# Patient Record
Sex: FEMALE | Race: WHITE | NOT HISPANIC OR LATINO | URBAN - METROPOLITAN AREA
[De-identification: names, ages, dates, MRNs, and addresses within clinical notes are randomized per-mention and may not be internally consistent; named-entity substitution may affect disease eponyms.]

---

## 2018-12-03 ENCOUNTER — OFFICE VISIT (OUTPATIENT)
Dept: FAMILY MEDICINE | Facility: CLINIC | Age: 38
End: 2018-12-03
Payer: COMMERCIAL

## 2018-12-03 VITALS
OXYGEN SATURATION: 98 % | WEIGHT: 206 LBS | BODY MASS INDEX: 35.17 KG/M2 | TEMPERATURE: 97.9 F | HEART RATE: 113 BPM | DIASTOLIC BLOOD PRESSURE: 77 MMHG | SYSTOLIC BLOOD PRESSURE: 116 MMHG | HEIGHT: 64 IN

## 2018-12-03 DIAGNOSIS — B34.9 ACUTE VIRAL SYNDROME: Primary | ICD-10-CM

## 2018-12-03 PROBLEM — J30.2 SEASONAL ALLERGIES: Status: ACTIVE | Noted: 2017-01-24

## 2018-12-03 PROBLEM — N39.0 UTI (URINARY TRACT INFECTION): Status: ACTIVE | Noted: 2017-01-24

## 2018-12-03 PROBLEM — R87.629 ABNORMAL PAP SMEAR OF VAGINA: Status: ACTIVE | Noted: 2017-01-24

## 2018-12-03 PROBLEM — J45.909 ASTHMA: Status: ACTIVE | Noted: 2017-01-24

## 2018-12-03 PROCEDURE — 99213 OFFICE O/P EST LOW 20 MIN: CPT | Performed by: FAMILY MEDICINE

## 2018-12-03 RX ORDER — FOLIC ACID/MULTIVIT,IRON,MINER 0.4MG-18MG
TABLET ORAL
COMMUNITY
Start: 2017-10-09 | End: 2024-07-01 | Stop reason: ALTCHOICE

## 2018-12-03 RX ORDER — FERROUS SULFATE 325(65) MG
1 TABLET ORAL EVERY OTHER DAY
Refills: 3 | COMMUNITY
Start: 2018-10-03 | End: 2019-07-30 | Stop reason: ALTCHOICE

## 2018-12-03 RX ORDER — BLACK COHOSH ROOT 200 MG
500 CAPSULE ORAL
Refills: 0 | COMMUNITY
Start: 2018-09-06 | End: 2019-07-30 | Stop reason: ALTCHOICE

## 2018-12-03 RX ORDER — ALBUTEROL SULFATE 90 UG/1
INHALANT RESPIRATORY (INHALATION)
COMMUNITY
Start: 2017-10-09 | End: 2019-07-30 | Stop reason: SDUPTHER

## 2018-12-03 RX ORDER — DOCUSATE SODIUM 100 MG/1
100 CAPSULE, LIQUID FILLED ORAL EVERY OTHER DAY
COMMUNITY
End: 2019-07-30 | Stop reason: ALTCHOICE

## 2018-12-03 ASSESSMENT — ENCOUNTER SYMPTOMS
SORE THROAT: 0
FEVER: 0
COUGH: 0

## 2018-12-03 NOTE — PROGRESS NOTES
[address] [address]  [place] [address]  [phone]       Reason for visit:   Chief Complaint   Patient presents with   • Sick     Pt stated she woke up this morning with body aches, sore lymph nodes, chills, congestion, and headaches. Her head is stuffy and just doesn't feel so well.  She just got over a cold a week ago.      NINI Bran is a 38 y.o. female who presents with sick. She had a cold last week but this got better. Muscle aches started yesterday. Now has body aches and swollen glands. No known temperature. No sore throat. No ear ache. No cough.      History reviewed. No pertinent past medical history.  History reviewed. No pertinent surgical history.  Social History     Social History   • Marital status:      Spouse name: N/A   • Number of children: N/A   • Years of education: N/A     Occupational History   • Not on file.     Social History Main Topics   • Smoking status: Never Smoker   • Smokeless tobacco: Never Used   • Alcohol use Yes      Comment: social   • Drug use: No   • Sexual activity: Not on file     Other Topics Concern   • Not on file     Social History Narrative    Do you wear your seatbelt? Yes    Do you have smoke detector in your home? Yes    Do you have a carbon monoxide detector in your home? 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          Family History   Problem Relation Age of Onset   • Cancer Maternal Grandmother    • Lung cancer Maternal Grandfather    • Lung cancer Paternal Grandmother    • Thyroid cancer Paternal Grandmother    • Heart attack Paternal Grandfather    • Skin cancer Paternal Grandfather      Azithromycin  Current Outpatient Prescriptions   Medication Sig Dispense Refill   • albuterol HFA (PROAIR HFA) 90 mcg/actuation inhaler inhale 2 puff by inhalation route  every 4 - 6 hours as needed     • ASCORBIC ACID WITH CRISSY HIPS 500 mg tablet Take 500 mg by mouth once  "daily.  0   • docusate sodium (COLACE) 100 mg capsule Take 100 mg by mouth every other day.     • ferrous sulfate 325 mg (65 mg iron) tablet Take 1 tablet by mouth every other day.    3   • Lactobacillus acidophilus (PROBIOTIC ORAL) Take 1 tablet by mouth every other day.     • PNV cmb#95-ferrous fumarate-FA (PRENATAL) 28 mg iron- 800 mcg tablet No SIG Entered       No current facility-administered medications for this visit.        Review of Systems   Constitutional: Negative for fever.   HENT: Negative for ear pain and sore throat.    Respiratory: Negative for cough.      Objective   Vitals:    12/03/18 1605   BP: 116/77   BP Location: Left upper arm   Patient Position: Sitting   Pulse: (!) 113   Temp: 36.6 °C (97.9 °F)   TempSrc: Oral   SpO2: 98%   Weight: 93.4 kg (206 lb)   Height: 1.626 m (5' 4\")     Body mass index is 35.36 kg/m².  Physical Exam   HENT:   Right Ear: Tympanic membrane normal.   Left Ear: Tympanic membrane normal.   Mouth/Throat: Oropharynx is clear and moist.   Pulmonary/Chest: Effort normal and breath sounds normal.   Abdominal: Soft. Bowel sounds are normal.       Procedures    Lab Results   Component Value Date    WBC 8.8 01/24/2017    HGB 14.7 01/24/2017    HCT 44.2 01/24/2017     01/24/2017    CHOL 226 (H) 01/24/2017    TRIG 107 01/24/2017    HDL 54 01/24/2017    LDLCALC 151 (H) 01/24/2017    ALT 18 01/24/2017    AST 18 01/24/2017     01/24/2017    K 4.0 01/24/2017     01/24/2017    CREATININE 0.98 01/24/2017    CO2 20 01/24/2017    TSH 2.51 01/24/2017    GLUCOSE 76 01/24/2017           Assessment   Problem List Items Addressed This Visit     Acute viral syndrome - Primary     Push fluids  Tylenol  Check for fever at home                   Harry A. Frankel, MD  12/4/2018                   "

## 2019-07-30 ENCOUNTER — OFFICE VISIT (OUTPATIENT)
Dept: FAMILY MEDICINE | Facility: CLINIC | Age: 39
End: 2019-07-30
Payer: COMMERCIAL

## 2019-07-30 VITALS
BODY MASS INDEX: 31.18 KG/M2 | HEIGHT: 64 IN | WEIGHT: 182.6 LBS | SYSTOLIC BLOOD PRESSURE: 109 MMHG | DIASTOLIC BLOOD PRESSURE: 83 MMHG | TEMPERATURE: 98.2 F | HEART RATE: 77 BPM | OXYGEN SATURATION: 99 %

## 2019-07-30 DIAGNOSIS — Z00.00 ROUTINE PHYSICAL EXAMINATION: Primary | ICD-10-CM

## 2019-07-30 DIAGNOSIS — Z83.719 FAMILY HISTORY OF COLONIC POLYPS: ICD-10-CM

## 2019-07-30 PROCEDURE — 99395 PREV VISIT EST AGE 18-39: CPT | Performed by: NURSE PRACTITIONER

## 2019-07-30 RX ORDER — ALBUTEROL SULFATE 90 UG/1
2 INHALANT RESPIRATORY (INHALATION) EVERY 4 HOURS PRN
Qty: 1 INHALER | Refills: 3 | Status: SHIPPED | OUTPATIENT
Start: 2019-07-30 | End: 2024-07-01 | Stop reason: SDUPTHER

## 2019-07-30 ASSESSMENT — ENCOUNTER SYMPTOMS
DIZZINESS: 0
AGITATION: 0
DIFFICULTY URINATING: 0
TREMORS: 0
COUGH: 0
BRUISES/BLEEDS EASILY: 0
PHOTOPHOBIA: 0
MYALGIAS: 0
VOICE CHANGE: 0
ABDOMINAL PAIN: 0
VOMITING: 0
WHEEZING: 0
BLOOD IN STOOL: 0
DIARRHEA: 0
FEVER: 0
SHORTNESS OF BREATH: 0
DIAPHORESIS: 0
APPETITE CHANGE: 0
NUMBNESS: 0
TROUBLE SWALLOWING: 0
NAUSEA: 0
CONSTIPATION: 0
ARTHRALGIAS: 0
DECREASED CONCENTRATION: 0
BACK PAIN: 0
WOUND: 0
FATIGUE: 0
HEADACHES: 0
HEMATURIA: 0
LIGHT-HEADEDNESS: 0
CHILLS: 0
JOINT SWELLING: 0
FLANK PAIN: 0
NECK PAIN: 0
CONFUSION: 0
NECK STIFFNESS: 0
NERVOUS/ANXIOUS: 0
ADENOPATHY: 0
COLOR CHANGE: 0
PALPITATIONS: 0
ACTIVITY CHANGE: 0
WEAKNESS: 0
EYE PAIN: 0
CHEST TIGHTNESS: 0
SLEEP DISTURBANCE: 0
UNEXPECTED WEIGHT CHANGE: 0

## 2019-07-30 NOTE — PROGRESS NOTES
[facility]  [address] [place] [address]  [phone]       Reason for visit:   Chief Complaint   Patient presents with   • Annual Exam      HPI   Sonia Bran is a 39 y.o. female who presents for routine annual physical exam    PMHx/PSHx: Seasonal allergies, asthma   Specialists   Ophthalmology: Annual eye exams at [facility]   Dermatology: No - hasn't had skin check in a few years   Gynecology: Annual/OB     Complaints: Denies; 13 month old daughter - still nursing. TTC for 2nd child    Social  Diet: Generally healthy - started Weight Watchers, down 30 lbs  Exercise: Regularly walking, but no formal  Sleep: No complaints  EtOH: Occasional, once/week glass of wine  Tobacco: Denies - never used  Drugs: Denies  Work: Teacher, off for summer  Lives with:  and bjuighter    FamHx: Pat GF - MI; MGF and PGM - Lung CA; Mother at age 39 - precancerous polyp  GynHx: Remote hx abnl pap/HPV s/p colp, STIs, ovarian cysts, fibroids  Menses - Regulated after 6 months post partum periods, between 28-34 days  SA/Contraception - 1 partner/  OBHx: , 9 lbs 5 oz.    Pap: UTD 2017  Mammo: Start at 42  Immunizations: Tdap, Flu        History reviewed. No pertinent past medical history.  History reviewed. No pertinent surgical history.  Social History     Social History   • Marital status:      Spouse name: N/A   • Number of children: N/A   • Years of education: N/A     Occupational History   • Not on file.     Social History Main Topics   • Smoking status: Never Smoker   • Smokeless tobacco: Never Used   • Alcohol use Yes      Comment: social   • Drug use: No   • Sexual activity: Not on file     Other Topics Concern   • Not on file     Social History Narrative    Do you wear your seatbelt? Yes    Do you have smoke detector in your home? Yes    Do you have a carbon monoxide detector in your home? 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          Family History   Problem Relation Age of Onset   • Cancer Maternal Grandmother    • Lung cancer Maternal Grandfather    • Lung cancer Paternal Grandmother    • Thyroid cancer Paternal Grandmother    • Heart attack Paternal Grandfather    • Skin cancer Paternal Grandfather      Azithromycin  Current Outpatient Prescriptions   Medication Sig Dispense Refill   • albuterol HFA (PROAIR HFA) 90 mcg/actuation inhaler Inhale 2 puffs every 4 (four) hours as needed for wheezing. 1 Inhaler 3   • PNV cmb#95-ferrous fumarate-FA (PRENATAL) 28 mg iron- 800 mcg tablet No SIG Entered       No current facility-administered medications for this visit.        Review of Systems   Constitutional: Negative for activity change, appetite change, chills, diaphoresis, fatigue, fever and unexpected weight change.   HENT: Negative for dental problem, hearing loss, tinnitus, trouble swallowing and voice change.    Eyes: Negative for photophobia, pain and visual disturbance.   Respiratory: Negative for cough, chest tightness, shortness of breath and wheezing.    Cardiovascular: Negative for chest pain, palpitations and leg swelling.   Gastrointestinal: Negative for abdominal pain, blood in stool, constipation, diarrhea, nausea and vomiting.   Genitourinary: Negative for difficulty urinating, flank pain, genital sores and hematuria.   Musculoskeletal: Negative for arthralgias, back pain, gait problem, joint swelling, myalgias, neck pain and neck stiffness.   Skin: Negative for color change, rash and wound.   Neurological: Negative for dizziness, tremors, syncope, weakness, light-headedness, numbness and headaches.   Hematological: Negative for adenopathy. Does not bruise/bleed easily.   Psychiatric/Behavioral: Negative for agitation, confusion, decreased concentration, self-injury, sleep disturbance and suicidal ideas. The patient is not nervous/anxious.      Objective   Vitals:    07/30/19 1755   BP: 109/83   Pulse: 77    Temp: 36.8 °C (98.2 °F)   SpO2: 99%       Physical Exam   Constitutional: She is oriented to person, place, and time. Vital signs are normal. She appears well-developed and well-nourished. She is cooperative.  Non-toxic appearance. She does not have a sickly appearance. She does not appear ill. No distress.   HENT:   Head: Normocephalic and atraumatic.   Right Ear: Tympanic membrane, external ear and ear canal normal.   Left Ear: Tympanic membrane, external ear and ear canal normal.   Nose: Nose normal.   Mouth/Throat: Uvula is midline, oropharynx is clear and moist and mucous membranes are normal. Normal dentition.   Eyes: Pupils are equal, round, and reactive to light. Conjunctivae, EOM and lids are normal. Right eye exhibits no discharge. Left eye exhibits no discharge.   Neck: Normal range of motion and full passive range of motion without pain. Neck supple. No JVD present. Carotid bruit is not present. No thyromegaly present.   Cardiovascular: Normal rate, regular rhythm, normal heart sounds, intact distal pulses and normal pulses.  Exam reveals no gallop and no friction rub.    No murmur heard.  Pulmonary/Chest: Effort normal and breath sounds normal. No respiratory distress. She has no wheezes. She has no rales.   Abdominal: Soft. Normal appearance and bowel sounds are normal. She exhibits no distension. There is no tenderness. There is no CVA tenderness.   Musculoskeletal: Normal range of motion. She exhibits no edema, tenderness or deformity.   Lymphadenopathy:     She has no cervical adenopathy.   Neurological: She is alert and oriented to person, place, and time. She has normal reflexes. No cranial nerve deficit.   Skin: Skin is warm, dry and intact. Capillary refill takes less than 2 seconds. No rash noted.   Psychiatric: She has a normal mood and affect. Her speech is normal and behavior is normal. Judgment and thought content normal. Cognition and memory are normal.       Procedures        Assessment    Problem List Items Addressed This Visit     None      Visit Diagnoses     Routine physical examination    -  Primary    Relevant Orders    CBC and Differential    Lipid panel    TSH 3rd Generation    Comprehensive metabolic panel    Family history of colonic polyps        Relevant Orders    Ambulatory referral to Gastroenterology      - Counseled on general health maintenance & reviewed preventive screening guidelines  - VS normal, BMI: 31. Continue efforts in weight loss, counseled on healthy, well-balanced diet and exercise.Discussed stress management and adequate sleep  - Regular follow up with GYN annually, w/ pap screening as recommended.   - Recommend annual skin cancer screening by Dermatology.   - Recommend annual eye exams by Ophtho  - RTO for labs, fasting  - Immunizations: UTD. Tdap with last pregnancy 2018   - Continue PNV        SALTY Llamas  7/30/2019

## 2019-08-08 ENCOUNTER — CLINICAL SUPPORT (OUTPATIENT)
Dept: FAMILY MEDICINE | Facility: CLINIC | Age: 39
End: 2019-08-08
Payer: COMMERCIAL

## 2019-08-08 DIAGNOSIS — N91.2 AMENORRHEA: Primary | ICD-10-CM

## 2019-08-08 DIAGNOSIS — Z00.00 ROUTINE PHYSICAL EXAMINATION: ICD-10-CM

## 2019-08-08 PROCEDURE — 36415 COLL VENOUS BLD VENIPUNCTURE: CPT | Performed by: NURSE PRACTITIONER

## 2019-08-09 LAB
ALBUMIN SERPL-MCNC: 4.4 G/DL (ref 3.6–5.1)
ALBUMIN/GLOB SERPL: 2 (CALC) (ref 1–2.5)
ALP SERPL-CCNC: 53 U/L (ref 33–115)
ALT SERPL-CCNC: 11 U/L (ref 6–29)
AST SERPL-CCNC: 12 U/L (ref 10–30)
B-HCG SERPL-ACNC: 32 MIU/ML
BASOPHILS # BLD AUTO: 30 CELLS/UL (ref 0–200)
BASOPHILS NFR BLD AUTO: 0.4 %
BILIRUB SERPL-MCNC: 1.2 MG/DL (ref 0.2–1.2)
BUN SERPL-MCNC: 18 MG/DL (ref 7–25)
BUN/CREAT SERPL: NORMAL (CALC) (ref 6–22)
CALCIUM SERPL-MCNC: 9.1 MG/DL (ref 8.6–10.2)
CHLORIDE SERPL-SCNC: 104 MMOL/L (ref 98–110)
CHOLEST SERPL-MCNC: 170 MG/DL
CHOLEST/HDLC SERPL: 2.9 (CALC)
CO2 SERPL-SCNC: 22 MMOL/L (ref 20–32)
CREAT SERPL-MCNC: 0.87 MG/DL (ref 0.5–1.1)
EOSINOPHIL # BLD AUTO: 240 CELLS/UL (ref 15–500)
EOSINOPHIL NFR BLD AUTO: 3.2 %
ERYTHROCYTE [DISTWIDTH] IN BLOOD BY AUTOMATED COUNT: 12 % (ref 11–15)
GLOBULIN SER CALC-MCNC: 2.2 G/DL (CALC) (ref 1.9–3.7)
GLUCOSE SERPL-MCNC: 78 MG/DL (ref 65–99)
HCT VFR BLD AUTO: 43.6 % (ref 35–45)
HDLC SERPL-MCNC: 59 MG/DL
HGB BLD-MCNC: 14.9 G/DL (ref 11.7–15.5)
LDLC SERPL CALC-MCNC: 98 MG/DL (CALC)
LYMPHOCYTES # BLD AUTO: 2978 CELLS/UL (ref 850–3900)
LYMPHOCYTES NFR BLD AUTO: 39.7 %
MCH RBC QN AUTO: 32.9 PG (ref 27–33)
MCHC RBC AUTO-ENTMCNC: 34.2 G/DL (ref 32–36)
MCV RBC AUTO: 96.2 FL (ref 80–100)
MONOCYTES # BLD AUTO: 488 CELLS/UL (ref 200–950)
MONOCYTES NFR BLD AUTO: 6.5 %
NEUTROPHILS # BLD AUTO: 3765 CELLS/UL (ref 1500–7800)
NEUTROPHILS NFR BLD AUTO: 50.2 %
NONHDLC SERPL-MCNC: 111 MG/DL (CALC)
PLATELET # BLD AUTO: 257 THOUSAND/UL (ref 140–400)
PMV BLD REES-ECKER: 9.7 FL (ref 7.5–12.5)
POTASSIUM SERPL-SCNC: 4.2 MMOL/L (ref 3.5–5.3)
PROT SERPL-MCNC: 6.6 G/DL (ref 6.1–8.1)
QUEST EGFR NON-AFR. AMERICAN: 85 ML/MIN/1.73M2
RBC # BLD AUTO: 4.53 MILLION/UL (ref 3.8–5.1)
SODIUM SERPL-SCNC: 139 MMOL/L (ref 135–146)
TRIGL SERPL-MCNC: 43 MG/DL
TSH SERPL-ACNC: 1.44 MIU/L
WBC # BLD AUTO: 7.5 THOUSAND/UL (ref 3.8–10.8)

## 2019-08-13 ENCOUNTER — TELEPHONE (OUTPATIENT)
Dept: FAMILY MEDICINE | Facility: CLINIC | Age: 39
End: 2019-08-13

## 2019-08-13 NOTE — TELEPHONE ENCOUNTER
- Spoke with patient discussed labs. hcg appropriate for pregnancy  - Pt saw Dr. Ureña GI - she is due for Colonoscopy d/t family hx, she will call to schedule AFTER pregnancy.

## 2019-09-16 ENCOUNTER — TELEPHONE (OUTPATIENT)
Dept: FAMILY MEDICINE | Facility: CLINIC | Age: 39
End: 2019-09-16

## 2019-09-16 NOTE — TELEPHONE ENCOUNTER
Patient is calling in regards to coming in to have her levels checked. She states that she has been to OB and her embryo is 3 weeks behind. She has an US scheduled for 2 weeks but would like to come into office to have levels checked as well to make sure everything is okay. Please call patient when available.

## 2019-09-17 NOTE — TELEPHONE ENCOUNTER
Can you let her know that this isn't something I typically do. I only check hcg levels to diagnose pregnancy not to monitor the embryo. She should ask her OB to do this.

## 2020-11-12 PROBLEM — B34.9 ACUTE VIRAL SYNDROME: Status: RESOLVED | Noted: 2018-12-03 | Resolved: 2020-11-12

## 2020-11-12 PROBLEM — N39.0 UTI (URINARY TRACT INFECTION): Status: RESOLVED | Noted: 2017-01-24 | Resolved: 2020-11-12

## 2020-11-16 ENCOUNTER — OFFICE VISIT (OUTPATIENT)
Dept: FAMILY MEDICINE | Facility: CLINIC | Age: 40
End: 2020-11-16
Payer: COMMERCIAL

## 2020-11-16 ENCOUNTER — TELEPHONE (OUTPATIENT)
Dept: FAMILY MEDICINE | Facility: CLINIC | Age: 40
End: 2020-11-16

## 2020-11-16 VITALS
WEIGHT: 222 LBS | HEIGHT: 64 IN | HEART RATE: 93 BPM | DIASTOLIC BLOOD PRESSURE: 71 MMHG | SYSTOLIC BLOOD PRESSURE: 100 MMHG | BODY MASS INDEX: 37.9 KG/M2 | OXYGEN SATURATION: 99 % | TEMPERATURE: 97.7 F

## 2020-11-16 DIAGNOSIS — Z00.00 WELL ADULT HEALTH CHECK: Primary | ICD-10-CM

## 2020-11-16 DIAGNOSIS — J30.2 SEASONAL ALLERGIES: ICD-10-CM

## 2020-11-16 DIAGNOSIS — J45.20 MILD INTERMITTENT ASTHMA WITHOUT COMPLICATION: ICD-10-CM

## 2020-11-16 PROCEDURE — 99395 PREV VISIT EST AGE 18-39: CPT | Performed by: NURSE PRACTITIONER

## 2020-11-16 RX ORDER — NORGESTIMATE AND ETHINYL ESTRADIOL 0.25-0.035
KIT ORAL
COMMUNITY
End: 2020-11-16

## 2020-11-16 RX ORDER — NAPROXEN 500 MG/1
TABLET ORAL
COMMUNITY
End: 2020-11-16

## 2020-11-16 RX ORDER — ECONAZOLE NITRATE 10 MG/G
CREAM TOPICAL
COMMUNITY
End: 2020-11-16

## 2020-11-16 ASSESSMENT — ENCOUNTER SYMPTOMS
ACTIVITY CHANGE: 0
DYSPHORIC MOOD: 0
ADENOPATHY: 0
ABDOMINAL PAIN: 0
COUGH: 0
BRUISES/BLEEDS EASILY: 0
UNEXPECTED WEIGHT CHANGE: 0
DIZZINESS: 0
ABDOMINAL DISTENTION: 0
HEADACHES: 0
MYALGIAS: 0
EYE DISCHARGE: 0
WEAKNESS: 0
DIFFICULTY URINATING: 0
ARTHRALGIAS: 0
FEVER: 0
EYE PAIN: 0
PALPITATIONS: 0
NUMBNESS: 0
SLEEP DISTURBANCE: 0
TROUBLE SWALLOWING: 0
APPETITE CHANGE: 0
NERVOUS/ANXIOUS: 0
FATIGUE: 0
BLOOD IN STOOL: 0
CHEST TIGHTNESS: 0
COLOR CHANGE: 0
CONFUSION: 0
HEMATURIA: 0
DYSURIA: 0
ENDOCRINE NEGATIVE: 1
SHORTNESS OF BREATH: 0

## 2020-11-16 NOTE — PATIENT INSTRUCTIONS
Routine advice for women:  1) I recommend limiting red meat (< or = to 1x/week), fried foods, and full-fat dairy (ice cream, cheese, etc.).    2) Exercise goal is 150 mins/week (30 mins most days of the week).    3) Monthly self breast exams.  Mammograms every 1 year if >40 years old, or possibly earlier if you have a family history of breast cancer.  4) PAP (cervical cell test) every 3 years if they have been normal in the past.  5) Colonoscopy starting at 50 years old for colon cancer screening.  This screening may start earlier if you have a family history of colon cancer.  6) You can take over the counter Calcium 600 mg with Vitamin D3 400 IU 2 tablets/day if you do not get 1,200 mg/day of calcium from your diet.

## 2020-11-16 NOTE — PROGRESS NOTES
Subjective      Patient ID: Sonia Bran is a 40 y.o. female.  1980      Well Adult Physical   Patient here for a comprehensive physical exam.    Do you take any herbs or supplements that were not prescribed by a doctor? Yes, PNV  Are you taking calcium supplements? no   Are you taking aspirin daily? no     History:  LMP: No LMP recorded. Patient is pregnant.  BRITTANY 2/5/21.  Last pap date: will request  Abnormal pap? Yes, history      Pt would like work exemption so she can work from home, since she is pregnant and has asthma.  States she only needs Albuterol inhaler when she is sick.  States her asthma is bad when she gets sick.        The following have been reviewed and updated as appropriate in this visit:  Tobacco  Allergies  Meds  Problems  Med Hx  Surg Hx  Fam Hx        Family History   Problem Relation Age of Onset   • Cancer Maternal Grandmother    • Lung cancer Maternal Grandfather    • Lung cancer Paternal Grandmother    • Thyroid cancer Paternal Grandmother    • Heart attack Paternal Grandfather    • Skin cancer Paternal Grandfather    • Stroke Paternal Grandfather    • Breast cancer Neg Hx        Review of Systems   Constitutional: Negative for activity change, appetite change, fatigue, fever and unexpected weight change.   HENT: Negative for congestion, ear pain, hearing loss and trouble swallowing.    Eyes: Negative for pain, discharge and visual disturbance.   Respiratory: Negative for cough, chest tightness and shortness of breath.    Cardiovascular: Negative for chest pain, palpitations and leg swelling.   Gastrointestinal: Negative for abdominal distention, abdominal pain and blood in stool.        Plans to get colonoscopy after she has delivered, since her mom had polyps in her 30s.   Endocrine: Negative.  Negative for cold intolerance and heat intolerance.   Genitourinary: Negative for difficulty urinating, dysuria and hematuria.        Urinating more frequently with pregnancy  "  Musculoskeletal: Negative for arthralgias and myalgias.   Skin: Negative for color change and rash.   Allergic/Immunologic: Positive for environmental allergies. Negative for immunocompromised state.        Pt reports her OB checked her immunity for MMR and pt was not immune to some things   Neurological: Negative for dizziness, weakness, numbness and headaches.   Hematological: Negative for adenopathy. Does not bruise/bleed easily.   Psychiatric/Behavioral: Negative for confusion, dysphoric mood, sleep disturbance and suicidal ideas. The patient is not nervous/anxious.        Objective     Vitals:    11/16/20 0836   BP: 100/71   BP Location: Left upper arm   Patient Position: Sitting   Pulse: 93   Temp: 36.5 °C (97.7 °F)   TempSrc: Temporal   SpO2: 99%   Weight: 101 kg (222 lb)   Height: 1.626 m (5' 4\")     Body mass index is 38.11 kg/m².    Physical Exam  Vitals signs and nursing note reviewed.   Constitutional:       Appearance: Normal appearance. She is well-developed. She is not ill-appearing or toxic-appearing.   HENT:      Head: Normocephalic and atraumatic.      Right Ear: Hearing, tympanic membrane and external ear normal.      Left Ear: Hearing, tympanic membrane and external ear normal.      Nose: Nose normal.      Mouth/Throat:      Dentition: Normal dentition.   Eyes:      General: Lids are normal. No scleral icterus.        Right eye: No discharge.         Left eye: No discharge.      Conjunctiva/sclera: Conjunctivae normal.      Right eye: Right conjunctiva is not injected.      Left eye: Left conjunctiva is not injected.      Pupils: Pupils are equal, round, and reactive to light.   Neck:      Musculoskeletal: Full passive range of motion without pain and neck supple.      Thyroid: No thyromegaly.      Vascular: No carotid bruit.      Trachea: Trachea normal.   Cardiovascular:      Rate and Rhythm: Normal rate and regular rhythm.      Pulses:           Dorsalis pedis pulses are 2+ on the right side " and 2+ on the left side.      Heart sounds: Normal heart sounds. No murmur. No friction rub. No gallop.    Pulmonary:      Effort: Pulmonary effort is normal.      Breath sounds: Normal breath sounds.   Abdominal:      General: Bowel sounds are normal.      Palpations: Abdomen is soft.   Musculoskeletal: Normal range of motion.         General: No tenderness.   Lymphadenopathy:      Cervical: No cervical adenopathy.      Upper Body:      Right upper body: No supraclavicular adenopathy.      Left upper body: No supraclavicular adenopathy.   Skin:     General: Skin is warm and dry.      Capillary Refill: Capillary refill takes less than 2 seconds.   Neurological:      Mental Status: She is alert and oriented to person, place, and time.      Cranial Nerves: No cranial nerve deficit.      Coordination: Coordination normal.      Deep Tendon Reflexes: Reflexes normal.   Psychiatric:         Speech: Speech normal.         Behavior: Behavior normal.         Thought Content: Thought content normal.         Judgment: Judgment normal.         Assessment/Plan   Diagnoses and all orders for this visit:    Well adult health check (Primary)  Eye UTD.  Due for dental exam (slightly delayed with COVID).  Will request PAP from OB/GYN.  Pt had recent labs with OB/GYN, will try to request.    Mild intermittent asthma without complication  Stable.      Seasonal allergies  Stable.    Physical 1 year.

## 2020-11-17 NOTE — TELEPHONE ENCOUNTER
I attempted to call their office and pressed option for physician line.     [phone]    I waited on hold for over 20 mins.   I will try again later

## 2021-04-13 DIAGNOSIS — Z23 ENCOUNTER FOR IMMUNIZATION: ICD-10-CM

## 2022-05-03 ENCOUNTER — TELEPHONE (OUTPATIENT)
Dept: FAMILY MEDICINE | Facility: CLINIC | Age: 42
End: 2022-05-03
Payer: COMMERCIAL

## 2022-05-03 NOTE — TELEPHONE ENCOUNTER
Pt tested positive for Covid today. She has little symptoms but is concerned as she has 2 small children and wanted to know how she can protect them from being infected?

## 2022-05-03 NOTE — TELEPHONE ENCOUNTER
Spoke to patient. Encouraged double masking and isolated as much as possible. This is difficult because she is breast feeding. Family will continue to monitor symptoms.

## 2022-05-10 ENCOUNTER — TELEPHONE (OUTPATIENT)
Dept: FAMILY MEDICINE | Facility: CLINIC | Age: 42
End: 2022-05-10
Payer: COMMERCIAL

## 2022-05-10 NOTE — LETTER
05/11/22    RE:Sonia Brna    Please excuse Ms. Bran from work 5/3/22 through 5/13/22. She may return to work 5/16/22.    Regards,        Tammy Dudley, DO

## 2022-05-10 NOTE — TELEPHONE ENCOUNTER
Pt tested positive for Covid May 3. She is allowed to be out of work for a total of 10 days. 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

## 2022-05-11 NOTE — TELEPHONE ENCOUNTER
Letter completed. Just to clarify, is she feeling better? If not, please offer a telemedicine visit.

## 2022-06-29 ENCOUNTER — OFFICE VISIT (OUTPATIENT)
Dept: FAMILY MEDICINE | Facility: CLINIC | Age: 42
End: 2022-06-29
Payer: COMMERCIAL

## 2022-06-29 VITALS
WEIGHT: 189.4 LBS | TEMPERATURE: 98.1 F | HEART RATE: 84 BPM | OXYGEN SATURATION: 99 % | HEIGHT: 64 IN | DIASTOLIC BLOOD PRESSURE: 79 MMHG | BODY MASS INDEX: 32.33 KG/M2 | SYSTOLIC BLOOD PRESSURE: 129 MMHG

## 2022-06-29 DIAGNOSIS — H57.89 RED EYE: Primary | ICD-10-CM

## 2022-06-29 DIAGNOSIS — J45.20 MILD INTERMITTENT ASTHMA WITHOUT COMPLICATION: ICD-10-CM

## 2022-06-29 PROCEDURE — 3008F BODY MASS INDEX DOCD: CPT | Performed by: FAMILY MEDICINE

## 2022-06-29 PROCEDURE — 99213 OFFICE O/P EST LOW 20 MIN: CPT | Performed by: FAMILY MEDICINE

## 2022-06-29 RX ORDER — GENTAMICIN SULFATE 3 MG/ML
SOLUTION/ DROPS OPHTHALMIC
COMMUNITY
Start: 2022-06-23 | End: 2024-07-01 | Stop reason: ALTCHOICE

## 2022-06-29 ASSESSMENT — ENCOUNTER SYMPTOMS
PHOTOPHOBIA: 0
EYE REDNESS: 1

## 2022-06-29 NOTE — PROGRESS NOTES
[place] [address]  [phone]       Reason for visit:   Chief Complaint   Patient presents with   • eye problem       NINI Bran is a 41 y.o. female who presents with red eye on the left. Right eye was draining. She called telemed and was put on gentamycin eye drop. It worked but she continued to use. Now right eye is fine but she has redness of left eye      History reviewed. No pertinent past medical history.  Past Surgical History:   Procedure Laterality Date   •  SECTION     • DILATION AND EVACUATION       Social History     Tobacco Use   • Smoking status: Never Smoker   • Smokeless tobacco: Never Used   Vaping Use   • Vaping Use: Never used   Substance Use Topics   • Alcohol use: Not Currently   • Drug use: No      Social History     Social History Narrative    Do you wear your seatbelt? Yes    Do you have smoke detector in your home? Yes    Do you have a carbon monoxide detector in your home? 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      Family History   Problem Relation Age of Onset   • Cancer Maternal Grandmother    • Lung cancer Maternal Grandfather    • Lung cancer Paternal Grandmother    • Thyroid cancer Paternal Grandmother    • Heart attack Paternal Grandfather    • Skin cancer Paternal Grandfather    • Stroke Paternal Grandfather    • Breast cancer Neg Hx      Azithromycin  Current Outpatient Medications   Medication Sig Dispense Refill   • albuterol HFA (PROAIR HFA) 90 mcg/actuation inhaler Inhale 2 puffs every 4 (four) hours as needed for wheezing. 1 Inhaler 3   • gentamicin (GARAMYCIN) 0.3 % ophthalmic solution INSTILL 1 DROP IN EACH EYE 4 TIMES A DAY     • PNV cmb#95-ferrous fumarate-FA 28 mg iron- 800 mcg tablet No SIG Entered       No current facility-administered medications for this visit.       Patient Active Problem List    Diagnosis Date Noted   • Red eye 2022   •  "Abnormal Pap smear of vagina 01/24/2017   • Asthma 01/24/2017   • Seasonal allergies 01/24/2017         Review of Systems   Eyes: Positive for redness. Negative for photophobia and visual disturbance.     Objective   Vitals:    06/29/22 1440   BP: 129/79   BP Location: Right upper arm   Patient Position: Sitting   Pulse: 84   Temp: 36.7 °C (98.1 °F)   TempSrc: Oral   SpO2: 99%   Weight: 85.9 kg (189 lb 6.4 oz)   Height: 1.626 m (5' 4\")     Body mass index is 32.51 kg/m².  Physical Exam  Eyes:      Comments: Left medial conjuntiva is red and irritated         Procedures    Lab Results   Component Value Date    WBC 7.5 08/08/2019    HGB 14.9 08/08/2019    HCT 43.6 08/08/2019     08/08/2019    CHOL 170 08/08/2019    TRIG 43 08/08/2019    HDL 59 08/08/2019    LDLCALC 98 08/08/2019    ALT 11 08/08/2019    AST 12 08/08/2019     08/08/2019    K 4.2 08/08/2019     08/08/2019    CREATININE 0.87 08/08/2019    BUN 18 08/08/2019    CO2 22 08/08/2019    TSH 1.44 08/08/2019    GLUCOSE 78 08/08/2019           Assessment   Problem List Items Addressed This Visit        Respiratory    Asthma       Other    Red eye - Primary     I suspect irritation from gentamycin  Stop drops call with worsening but she will call with update in 2 days  TC steroid eye drop or see eye doctor                     Harry A. Frankel, MD  6/29/2022                   "

## 2022-06-29 NOTE — ASSESSMENT & PLAN NOTE
I suspect irritation from gentamycin  Stop drops call with worsening but she will call with update in 2 days  TC steroid eye drop or see eye doctor

## 2022-07-22 ENCOUNTER — OFFICE VISIT (OUTPATIENT)
Dept: FAMILY MEDICINE | Facility: CLINIC | Age: 42
End: 2022-07-22
Payer: COMMERCIAL

## 2022-07-22 VITALS
DIASTOLIC BLOOD PRESSURE: 78 MMHG | SYSTOLIC BLOOD PRESSURE: 111 MMHG | HEIGHT: 64 IN | WEIGHT: 190.6 LBS | BODY MASS INDEX: 32.54 KG/M2 | TEMPERATURE: 98 F | OXYGEN SATURATION: 99 % | HEART RATE: 70 BPM

## 2022-07-22 DIAGNOSIS — Z13.220 SCREENING CHOLESTEROL LEVEL: ICD-10-CM

## 2022-07-22 DIAGNOSIS — Z00.00 ENCOUNTER FOR GENERAL ADULT MEDICAL EXAMINATION WITHOUT ABNORMAL FINDINGS: Primary | ICD-10-CM

## 2022-07-22 PROCEDURE — 3008F BODY MASS INDEX DOCD: CPT | Performed by: FAMILY MEDICINE

## 2022-07-22 PROCEDURE — 99396 PREV VISIT EST AGE 40-64: CPT | Performed by: FAMILY MEDICINE

## 2022-07-22 ASSESSMENT — PATIENT HEALTH QUESTIONNAIRE - PHQ9: SUM OF ALL RESPONSES TO PHQ9 QUESTIONS 1 & 2: 0

## 2022-07-22 NOTE — PROGRESS NOTES
"HPI:  Sonia Bran is an 42 y.o. female presenting for annual well visit.      Mild intermittent asthma - flares only with URI.  No recent need for Albuterol - has unexpired inhaler at home.    Currently breast feeding.  Scheduled to see gynecology  - last PAP .    Allergies:  Azithromycin    History reviewed. No pertinent past medical history.    Past Surgical History:   Procedure Laterality Date   •  SECTION     • DILATION AND EVACUATION         Social History     Tobacco Use   • Smoking status: Never Smoker   • Smokeless tobacco: Never Used   Substance Use Topics   • Alcohol use: Not Currently       Current Outpatient Medications on File Prior to Visit   Medication Sig Dispense Refill   • albuterol HFA (PROAIR HFA) 90 mcg/actuation inhaler Inhale 2 puffs every 4 (four) hours as needed for wheezing. 1 Inhaler 3   • PNV cmb#95-ferrous fumarate-FA 28 mg iron- 800 mcg tablet No SIG Entered     • gentamicin (GARAMYCIN) 0.3 % ophthalmic solution INSTILL 1 DROP IN EACH EYE 4 TIMES A DAY       No current facility-administered medications on file prior to visit.       Family History   Problem Relation Age of Onset   • Cancer Maternal Grandmother    • Lung cancer Maternal Grandfather    • Lung cancer Paternal Grandmother    • Thyroid cancer Paternal Grandmother    • Heart attack Paternal Grandfather    • Skin cancer Paternal Grandfather    • Stroke Paternal Grandfather    • Breast cancer Neg Hx        Visit Vitals  /78 (BP Location: Right upper arm, Patient Position: Sitting)   Pulse 70   Temp 36.7 °C (98 °F) (Oral)   Ht 1.626 m (5' 4\")   Wt 86.5 kg (190 lb 9.6 oz)   SpO2 99%   BMI 32.72 kg/m²        Physical Exam  Constitutional:       General: She is not in acute distress.     Appearance: Normal appearance. She is not ill-appearing.   HENT:      Right Ear: Tympanic membrane and ear canal normal.      Left Ear: Tympanic membrane and ear canal normal.      Nose: Nose normal.      Mouth/Throat:      " Pharynx: Oropharynx is clear.   Eyes:      Extraocular Movements: Extraocular movements intact.      Pupils: Pupils are equal, round, and reactive to light.   Neck:      Thyroid: No thyromegaly.   Cardiovascular:      Rate and Rhythm: Normal rate and regular rhythm.      Pulses: Normal pulses.           Dorsalis pedis pulses are 2+ on the right side and 2+ on the left side.      Heart sounds: Normal heart sounds. No murmur heard.  Pulmonary:      Effort: Pulmonary effort is normal. No respiratory distress.      Breath sounds: Normal breath sounds. No wheezing, rhonchi or rales.   Abdominal:      General: Bowel sounds are normal.      Palpations: Abdomen is soft. There is no mass.      Tenderness: There is no abdominal tenderness. There is no guarding or rebound.   Musculoskeletal:         General: No swelling or tenderness. Normal range of motion.      Cervical back: Normal range of motion and neck supple.      Right lower leg: No edema.      Left lower leg: No edema.   Lymphadenopathy:      Cervical: No cervical adenopathy.   Skin:     Findings: No lesion or rash.   Neurological:      General: No focal deficit present.      Mental Status: She is alert.      Deep Tendon Reflexes: Reflexes normal.   Psychiatric:         Mood and Affect: Mood normal.           A/P  1. Encounter for general adult medical examination without abnormal findings    - CBC and Differential  - Comprehensive metabolic panel    2. Screening cholesterol level    - Lipid panel      Postpone mammogram since she is breastfeeding.    Next appointment recommended:  1 year/prn      The patient (parent) indicates an understanding of the events of today's medical evaluation and agrees to the plan of care.    I have asked the patient (parent) to be on the alert for new or worsening symptoms and to call the office directly or proceed to the nearest ER if such should occur.

## 2022-07-23 LAB
ALBUMIN SERPL-MCNC: 4.7 G/DL (ref 3.6–5.1)
ALBUMIN/GLOB SERPL: 2.1 (CALC) (ref 1–2.5)
ALP SERPL-CCNC: 54 U/L (ref 31–125)
ALT SERPL-CCNC: 12 U/L (ref 6–29)
AST SERPL-CCNC: 12 U/L (ref 10–30)
BASOPHILS # BLD AUTO: 51 CELLS/UL (ref 0–200)
BASOPHILS NFR BLD AUTO: 0.7 %
BILIRUB SERPL-MCNC: 1.1 MG/DL (ref 0.2–1.2)
BUN SERPL-MCNC: 15 MG/DL (ref 7–25)
BUN/CREAT SERPL: NORMAL (CALC) (ref 6–22)
CALCIUM SERPL-MCNC: 9.6 MG/DL (ref 8.6–10.2)
CHLORIDE SERPL-SCNC: 104 MMOL/L (ref 98–110)
CHOLEST SERPL-MCNC: 228 MG/DL
CHOLEST/HDLC SERPL: 3.3 (CALC)
CO2 SERPL-SCNC: 25 MMOL/L (ref 20–32)
CREAT SERPL-MCNC: 0.85 MG/DL (ref 0.5–0.99)
EGFRCR SERPLBLD CKD-EPI 2021: 89 ML/MIN/1.73M2
EOSINOPHIL # BLD AUTO: 139 CELLS/UL (ref 15–500)
EOSINOPHIL NFR BLD AUTO: 1.9 %
ERYTHROCYTE [DISTWIDTH] IN BLOOD BY AUTOMATED COUNT: 11.7 % (ref 11–15)
GLOBULIN SER CALC-MCNC: 2.2 G/DL (CALC) (ref 1.9–3.7)
GLUCOSE SERPL-MCNC: 79 MG/DL (ref 65–99)
HCT VFR BLD AUTO: 43.4 % (ref 35–45)
HDLC SERPL-MCNC: 70 MG/DL
HGB BLD-MCNC: 14.6 G/DL (ref 11.7–15.5)
LDLC SERPL CALC-MCNC: 142 MG/DL (CALC)
LYMPHOCYTES # BLD AUTO: 3095 CELLS/UL (ref 850–3900)
LYMPHOCYTES NFR BLD AUTO: 42.4 %
MCH RBC QN AUTO: 31.9 PG (ref 27–33)
MCHC RBC AUTO-ENTMCNC: 33.6 G/DL (ref 32–36)
MCV RBC AUTO: 95 FL (ref 80–100)
MONOCYTES # BLD AUTO: 431 CELLS/UL (ref 200–950)
MONOCYTES NFR BLD AUTO: 5.9 %
NEUTROPHILS # BLD AUTO: 3584 CELLS/UL (ref 1500–7800)
NEUTROPHILS NFR BLD AUTO: 49.1 %
NONHDLC SERPL-MCNC: 158 MG/DL (CALC)
PLATELET # BLD AUTO: 281 THOUSAND/UL (ref 140–400)
PMV BLD REES-ECKER: 9.6 FL (ref 7.5–12.5)
POTASSIUM SERPL-SCNC: 4.1 MMOL/L (ref 3.5–5.3)
PROT SERPL-MCNC: 6.9 G/DL (ref 6.1–8.1)
RBC # BLD AUTO: 4.57 MILLION/UL (ref 3.8–5.1)
SODIUM SERPL-SCNC: 139 MMOL/L (ref 135–146)
TRIGL SERPL-MCNC: 66 MG/DL
WBC # BLD AUTO: 7.3 THOUSAND/UL (ref 3.8–10.8)

## 2024-07-01 ENCOUNTER — OFFICE VISIT (OUTPATIENT)
Dept: FAMILY MEDICINE | Facility: CLINIC | Age: 44
End: 2024-07-01
Payer: COMMERCIAL

## 2024-07-01 VITALS
BODY MASS INDEX: 35.27 KG/M2 | RESPIRATION RATE: 16 BRPM | WEIGHT: 206.6 LBS | SYSTOLIC BLOOD PRESSURE: 122 MMHG | DIASTOLIC BLOOD PRESSURE: 80 MMHG | TEMPERATURE: 98.5 F | HEIGHT: 64 IN | OXYGEN SATURATION: 99 % | HEART RATE: 78 BPM

## 2024-07-01 DIAGNOSIS — J45.20 MILD INTERMITTENT ASTHMA WITHOUT COMPLICATION: ICD-10-CM

## 2024-07-01 DIAGNOSIS — Z13.220 SCREENING CHOLESTEROL LEVEL: ICD-10-CM

## 2024-07-01 DIAGNOSIS — Z00.00 ENCOUNTER FOR GENERAL ADULT MEDICAL EXAMINATION WITHOUT ABNORMAL FINDINGS: Primary | ICD-10-CM

## 2024-07-01 PROCEDURE — 99396 PREV VISIT EST AGE 40-64: CPT | Performed by: FAMILY MEDICINE

## 2024-07-01 PROCEDURE — 3008F BODY MASS INDEX DOCD: CPT | Performed by: FAMILY MEDICINE

## 2024-07-01 RX ORDER — ALBUTEROL SULFATE 90 UG/1
2 INHALANT RESPIRATORY (INHALATION) EVERY 4 HOURS PRN
Qty: 1 EACH | Refills: 3 | Status: SHIPPED | OUTPATIENT
Start: 2024-07-01

## 2024-07-01 ASSESSMENT — PATIENT HEALTH QUESTIONNAIRE - PHQ9: SUM OF ALL RESPONSES TO PHQ9 QUESTIONS 1 & 2: 0

## 2024-07-01 NOTE — PROGRESS NOTES
"HPI:  Sonia Bran is an 44 y.o. female presenting for annual well visit.    Has tried Weight Watchers multiple times, gets benefit but then gets discouraged.  Breast fed for 5 years.  Walking/strength training program.    Has checked with insurance and reports weight loss medication is covered.    Gyn - [facility] - PAP 10/2023.    Allergies:  Azithromycin    History reviewed. No pertinent past medical history.    Past Surgical History:   Procedure Laterality Date     SECTION      x 2    DILATION AND EVACUATION         Social History     Tobacco Use    Smoking status: Never    Smokeless tobacco: Never   Substance Use Topics    Alcohol use: Yes     Comment: once a week       Current Outpatient Medications on File Prior to Visit   Medication Sig Dispense Refill    multivitamin/folic acid/dha (MULTIVITAMIN-FA-DHA ORAL) Take 1 tablet by mouth daily.       No current facility-administered medications on file prior to visit.       Family History   Problem Relation Age of Onset    No Known Problems Biological Mother     Asthma Biological Father     No Known Problems Biological Brother     No Known Problems Biological Brother     Cancer Maternal Grandmother     Lung cancer Maternal Grandfather     Lung cancer Paternal Grandmother     Thyroid cancer Paternal Grandmother     Heart attack Paternal Grandfather     Skin cancer Paternal Grandfather     Stroke Paternal Grandfather     Breast cancer Neg Hx        Visit Vitals  /80   Pulse 78   Temp 36.9 °C (98.5 °F) (Oral)   Resp 16   Ht 1.626 m (5' 4\")   Wt 93.7 kg (206 lb 9.6 oz)   LMP 2024   SpO2 99%   BMI 35.46 kg/m²        Physical Exam  Constitutional:       General: She is not in acute distress.     Appearance: Normal appearance. She is not ill-appearing.   HENT:      Right Ear: Tympanic membrane and ear canal normal.      Left Ear: Tympanic membrane and ear canal normal.      Nose: Nose normal.      Mouth/Throat:      Pharynx: Oropharynx is clear. "   Eyes:      Extraocular Movements: Extraocular movements intact.      Pupils: Pupils are equal, round, and reactive to light.   Neck:      Thyroid: No thyromegaly.   Cardiovascular:      Rate and Rhythm: Normal rate and regular rhythm.      Pulses: Normal pulses.           Dorsalis pedis pulses are 2+ on the right side and 2+ on the left side.      Heart sounds: Normal heart sounds. No murmur heard.  Pulmonary:      Effort: Pulmonary effort is normal. No respiratory distress.      Breath sounds: Normal breath sounds. No wheezing, rhonchi or rales.   Abdominal:      General: Bowel sounds are normal.      Palpations: Abdomen is soft. There is no mass.      Tenderness: There is no abdominal tenderness. There is no guarding or rebound.   Musculoskeletal:         General: No swelling or tenderness. Normal range of motion.      Cervical back: Normal range of motion and neck supple.      Right lower leg: No edema.      Left lower leg: No edema.   Lymphadenopathy:      Cervical: No cervical adenopathy.   Skin:     Findings: No lesion or rash.   Neurological:      General: No focal deficit present.      Mental Status: She is alert.      Deep Tendon Reflexes: Reflexes normal.   Psychiatric:         Mood and Affect: Mood normal.         A/P  1. Encounter for general adult medical examination without abnormal findings    - CBC and Differential  - Comprehensive metabolic panel    2. Screening cholesterol level    - Lipid panel    3. BMI 35.0-35.9,adult    - TSH w reflex FT4    4. Mild intermittent asthma without complication  controlled     Patient would like to start Wegovy/Zepbound  She/he has confirmed that her/his insurance will cover medication.  No personal or family history of medullary thyroid cancer or MEN 2A or 2B  No history of pancreatitis.  Not pregnant or planning to conceive.  Titration of medication, potential SE's, and need for long term use discussed.  Patient advised to send [employer] message after 3rd dose  with status update (any SE's/weight loss achieved)  Follow-up appointment in 3 months.   Next appointment recommended:  TBD      The patient (parent) indicates an understanding of the events of today's medical evaluation and agrees to the plan of care.    I have asked the patient (parent) to be on the alert for new or worsening symptoms and to call the office directly or proceed to the nearest ER if such should occur.

## 2024-07-02 LAB
ALBUMIN SERPL-MCNC: 4.6 G/DL (ref 3.6–5.1)
ALBUMIN/GLOB SERPL: 2.2 (CALC) (ref 1–2.5)
ALP SERPL-CCNC: 51 U/L (ref 31–125)
ALT SERPL-CCNC: 14 U/L (ref 6–29)
AST SERPL-CCNC: 16 U/L (ref 10–30)
BASOPHILS # BLD AUTO: 18 CELLS/UL (ref 0–200)
BASOPHILS NFR BLD AUTO: 0.3 %
BILIRUB SERPL-MCNC: 0.8 MG/DL (ref 0.2–1.2)
BUN SERPL-MCNC: 13 MG/DL (ref 7–25)
BUN/CREAT SERPL: NORMAL (CALC) (ref 6–22)
CALCIUM SERPL-MCNC: 9.5 MG/DL (ref 8.6–10.2)
CHLORIDE SERPL-SCNC: 106 MMOL/L (ref 98–110)
CHOLEST SERPL-MCNC: 208 MG/DL
CHOLEST/HDLC SERPL: 3.6 (CALC)
CO2 SERPL-SCNC: 23 MMOL/L (ref 20–32)
CREAT SERPL-MCNC: 0.87 MG/DL (ref 0.5–0.99)
EGFRCR SERPLBLD CKD-EPI 2021: 85 ML/MIN/1.73M2
EOSINOPHIL # BLD AUTO: 378 CELLS/UL (ref 15–500)
EOSINOPHIL NFR BLD AUTO: 6.3 %
ERYTHROCYTE [DISTWIDTH] IN BLOOD BY AUTOMATED COUNT: 11.9 % (ref 11–15)
GLOBULIN SER CALC-MCNC: 2.1 G/DL (CALC) (ref 1.9–3.7)
GLUCOSE SERPL-MCNC: 87 MG/DL (ref 65–99)
HCT VFR BLD AUTO: 41.9 % (ref 35–45)
HDLC SERPL-MCNC: 57 MG/DL
HGB BLD-MCNC: 14.3 G/DL (ref 11.7–15.5)
LDLC SERPL CALC-MCNC: 134 MG/DL (CALC)
LYMPHOCYTES # BLD AUTO: 2088 CELLS/UL (ref 850–3900)
LYMPHOCYTES NFR BLD AUTO: 34.8 %
MCH RBC QN AUTO: 32.4 PG (ref 27–33)
MCHC RBC AUTO-ENTMCNC: 34.1 G/DL (ref 32–36)
MCV RBC AUTO: 95 FL (ref 80–100)
MONOCYTES # BLD AUTO: 462 CELLS/UL (ref 200–950)
MONOCYTES NFR BLD AUTO: 7.7 %
NEUTROPHILS # BLD AUTO: 3054 CELLS/UL (ref 1500–7800)
NEUTROPHILS NFR BLD AUTO: 50.9 %
NONHDLC SERPL-MCNC: 151 MG/DL (CALC)
PLATELET # BLD AUTO: 255 THOUSAND/UL (ref 140–400)
PMV BLD REES-ECKER: 10.7 FL (ref 7.5–12.5)
POTASSIUM SERPL-SCNC: 4.3 MMOL/L (ref 3.5–5.3)
PROT SERPL-MCNC: 6.7 G/DL (ref 6.1–8.1)
RBC # BLD AUTO: 4.41 MILLION/UL (ref 3.8–5.1)
SODIUM SERPL-SCNC: 138 MMOL/L (ref 135–146)
TRIGL SERPL-MCNC: 78 MG/DL
TSH SERPL-ACNC: 2.39 MIU/L
WBC # BLD AUTO: 6 THOUSAND/UL (ref 3.8–10.8)

## 2024-07-09 NOTE — TELEPHONE ENCOUNTER
Patient asking for her medication to be sent to the [facility] pharmacy in [place] meeting.     Medication Wegovy

## 2024-09-17 ENCOUNTER — PATIENT MESSAGE (OUTPATIENT)
Dept: FAMILY MEDICINE | Facility: CLINIC | Age: 44
End: 2024-09-17
Payer: COMMERCIAL

## 2024-09-18 NOTE — TELEPHONE ENCOUNTER
Winston Millard,    I am the office nurse for the practice please give me a call a the office.      Padmini Argueta,RN  Ambulatory Staff Nurse   [place] [address]  [place] [address]  Tele: [phone]  Fax:  [phone]

## 2024-10-14 ENCOUNTER — OFFICE VISIT (OUTPATIENT)
Dept: FAMILY MEDICINE | Facility: CLINIC | Age: 44
End: 2024-10-14
Payer: COMMERCIAL

## 2024-10-14 VITALS
HEART RATE: 78 BPM | OXYGEN SATURATION: 99 % | DIASTOLIC BLOOD PRESSURE: 74 MMHG | WEIGHT: 192.6 LBS | BODY MASS INDEX: 32.88 KG/M2 | HEIGHT: 64 IN | TEMPERATURE: 97.3 F | SYSTOLIC BLOOD PRESSURE: 122 MMHG

## 2024-10-14 DIAGNOSIS — E66.9 OBESITY (BMI 35.0-39.9 WITHOUT COMORBIDITY): Primary | ICD-10-CM

## 2024-10-14 PROCEDURE — 99213 OFFICE O/P EST LOW 20 MIN: CPT | Performed by: FAMILY MEDICINE

## 2024-10-14 PROCEDURE — 3008F BODY MASS INDEX DOCD: CPT | Performed by: FAMILY MEDICINE

## 2024-10-14 NOTE — PROGRESS NOTES
"HPI:  Sonia Bran is an 44 y.o. female presenting for follow-up weight management on Wegovy.  Nausea and constipation - with increase to 1 mg dose.    Drinking more water and fiber twice a day.    Patient's target 160-170 lbs.    Wt Readings from Last 3 Encounters:   10/14/24 87.4 kg (192 lb 9.6 oz)   24 93.7 kg (206 lb 9.6 oz)   22 86.5 kg (190 lb 9.6 oz)         Allergies:  Azithromycin    History reviewed. No pertinent past medical history.    Past Surgical History   Procedure Laterality Date     section      x 2    Dilation and evacuation         Social History     Tobacco Use    Smoking status: Never    Smokeless tobacco: Never   Substance Use Topics    Alcohol use: Yes     Comment: once a week       Current Outpatient Medications on File Prior to Visit   Medication Sig Dispense Refill    albuterol HFA (PROAIR HFA) 90 mcg/actuation inhaler Inhale 2 puffs every 4 (four) hours as needed for wheezing. 1 each 3    multivitamin/folic acid/dha (MULTIVITAMIN-FA-DHA ORAL) Take 1 tablet by mouth daily.      semaglutide (WEGOVY) 1 mg/0.5 mL subcutaneous injection Inject 1 mg under the skin every (seven) 7 days. 2 mL 0     No current facility-administered medications on file prior to visit.       Family History   Problem Relation Name Age of Onset    No Known Problems Biological Mother      Asthma Biological Father      No Known Problems Biological Brother      No Known Problems Biological Brother      Cancer Maternal Grandmother      Lung cancer Maternal Grandfather      Lung cancer Paternal Grandmother      Thyroid cancer Paternal Grandmother      Heart attack Paternal Grandfather      Skin cancer Paternal Grandfather      Stroke Paternal Grandfather      Breast cancer Neg Hx         Visit Vitals  /74 (BP Location: Left upper arm, Patient Position: Sitting)   Pulse 78   Temp 36.3 °C (97.3 °F) (Temporal)   Ht 1.626 m (5' 4\")   Wt 87.4 kg (192 lb 9.6 oz)   SpO2 99%   BMI 33.06 kg/m²    "     Physical Exam  Constitutional:       Appearance: Normal appearance.   Neurological:      Mental Status: She is alert.           A/P  1. Obesity (BMI 35.0-39.9 without comorbidity) (Primary)  Overall tolerating Wegoyv - SE's minimal and tolerable  Noting benefit from Wegovy  Increase Wegovy to 1.7 mg  - semaglutide (WEGOVY) 1.7 mg/0.75 mL subcutaneous injection; Inject 1.7 mg under the skin every (seven) 7 days.  Dispense: 3 mL; Refill: 0           Next appointment recommended:  3 months      The patient (parent) indicates an understanding of the events of today's medical evaluation and agrees to the plan of care.    I have asked the patient (parent) to be on the alert for new or worsening symptoms and to call the office directly or proceed to the nearest ER if such should occur.    Total time spent on day of encounter 20 minutes.

## 2024-11-12 ENCOUNTER — PATIENT MESSAGE (OUTPATIENT)
Dept: FAMILY MEDICINE | Facility: CLINIC | Age: 44
End: 2024-11-12
Payer: COMMERCIAL

## 2024-11-12 DIAGNOSIS — E66.9 OBESITY (BMI 35.0-39.9 WITHOUT COMORBIDITY): ICD-10-CM

## 2024-12-09 DIAGNOSIS — E66.9 OBESITY (BMI 35.0-39.9 WITHOUT COMORBIDITY): ICD-10-CM

## 2025-02-06 ENCOUNTER — PATIENT MESSAGE (OUTPATIENT)
Dept: FAMILY MEDICINE | Facility: CLINIC | Age: 45
End: 2025-02-06
Payer: COMMERCIAL

## 2025-02-06 DIAGNOSIS — E66.9 OBESITY (BMI 35.0-39.9 WITHOUT COMORBIDITY): Primary | ICD-10-CM

## 2025-02-13 ENCOUNTER — OFFICE VISIT (OUTPATIENT)
Dept: FAMILY MEDICINE | Facility: CLINIC | Age: 45
End: 2025-02-13
Payer: COMMERCIAL

## 2025-02-13 VITALS
DIASTOLIC BLOOD PRESSURE: 70 MMHG | HEIGHT: 64 IN | BODY MASS INDEX: 31.96 KG/M2 | HEART RATE: 87 BPM | SYSTOLIC BLOOD PRESSURE: 118 MMHG | TEMPERATURE: 98 F | OXYGEN SATURATION: 99 % | WEIGHT: 187.2 LBS

## 2025-02-13 DIAGNOSIS — J45.20 MILD INTERMITTENT ASTHMA WITHOUT COMPLICATION: ICD-10-CM

## 2025-02-13 DIAGNOSIS — E66.9 OBESITY (BMI 35.0-39.9 WITHOUT COMORBIDITY): Primary | ICD-10-CM

## 2025-02-13 PROCEDURE — 3008F BODY MASS INDEX DOCD: CPT | Performed by: FAMILY MEDICINE

## 2025-02-13 PROCEDURE — 99213 OFFICE O/P EST LOW 20 MIN: CPT | Performed by: FAMILY MEDICINE

## 2025-02-13 NOTE — PROGRESS NOTES
"HPI:  Sonia Bran is an 44 y.o. female presenting for follow-up weight management.   Noted plateau on Wegovy 1.7 mg - will start 2.4 mg next week.  Very minimal intermittent nausea.  Taking fiber twice a day. Good water intake.  Exercises more in the summer but gets a lot of steps in at work    Goal weight 150 lbs.    Asthma - has improved as she has gotten older. Rarely needs Albuterol.    Wt Readings from Last 3 Encounters:   25 84.9 kg (187 lb 3.2 oz)   10/14/24 87.4 kg (192 lb 9.6 oz)   24 93.7 kg (206 lb 9.6 oz)        Allergies:  Azithromycin    History reviewed. No pertinent past medical history.    Past Surgical History   Procedure Laterality Date     section      x 2    Dilation and evacuation         Social History     Tobacco Use    Smoking status: Never    Smokeless tobacco: Never   Substance Use Topics    Alcohol use: Yes     Comment: once a week       Medications Ordered Prior to Encounter[1]    Family History   Problem Relation Name Age of Onset    No Known Problems Biological Mother      Asthma Biological Father      No Known Problems Biological Brother      No Known Problems Biological Brother      Cancer Maternal Grandmother      Lung cancer Maternal Grandfather      Lung cancer Paternal Grandmother      Thyroid cancer Paternal Grandmother      Heart attack Paternal Grandfather      Skin cancer Paternal Grandfather      Stroke Paternal Grandfather      Breast cancer Neg Hx         Visit Vitals  /70 (BP Location: Left upper arm, Patient Position: Sitting)   Pulse 87   Temp 36.7 °C (98 °F) (Temporal)   Ht 1.626 m (5' 4\")   Wt 84.9 kg (187 lb 3.2 oz)   SpO2 99%   BMI 32.13 kg/m²        Physical Exam  Constitutional:       Appearance: Normal appearance.   Neurological:      Mental Status: She is alert.           A/P  1. Obesity (BMI 35.0-39.9 without comorbidity) (Primary)  Noting benefit from Wegovy  [place] start 2.4 mg dose next week.  Continue healthy diet and " exercise    2. Mild intermittent asthma without complication  controlled           Next appointment recommended:  3 months      The patient (parent) indicates an understanding of the events of today's medical evaluation and agrees to the plan of care.    I have asked the patient (parent) to be on the alert for new or worsening symptoms and to call the office directly or proceed to the nearest ER if such should occur.    Total time spent on day of encounter 20 minutes.         [1]   Current Outpatient Medications on File Prior to Visit   Medication Sig Dispense Refill    albuterol HFA (PROAIR HFA) 90 mcg/actuation inhaler Inhale 2 puffs every 4 (four) hours as needed for wheezing. 1 each 3    multivitamin/folic acid/dha (MULTIVITAMIN-FA-DHA ORAL) Take 1 tablet by mouth daily.      semaglutide (WEGOVY) 2.4 mg/0.75 mL subcutaneous injection Inject 2.4 mg under the skin every (seven) 7 days. 2 mL 0     No current facility-administered medications on file prior to visit.

## 2025-03-06 DIAGNOSIS — E66.9 OBESITY (BMI 35.0-39.9 WITHOUT COMORBIDITY): ICD-10-CM

## 2025-03-06 RX ORDER — SEMAGLUTIDE 2.4 MG/.75ML
INJECTION, SOLUTION SUBCUTANEOUS
Qty: 3 ML | Refills: 1 | Status: SHIPPED | OUTPATIENT
Start: 2025-03-06 | End: 2025-05-01

## 2025-06-12 ENCOUNTER — OFFICE VISIT (OUTPATIENT)
Dept: FAMILY MEDICINE | Facility: CLINIC | Age: 45
End: 2025-06-12
Payer: COMMERCIAL

## 2025-06-12 VITALS
BODY MASS INDEX: 30.35 KG/M2 | OXYGEN SATURATION: 98 % | DIASTOLIC BLOOD PRESSURE: 70 MMHG | WEIGHT: 177.8 LBS | SYSTOLIC BLOOD PRESSURE: 110 MMHG | TEMPERATURE: 97.9 F | HEIGHT: 64 IN | HEART RATE: 87 BPM

## 2025-06-12 DIAGNOSIS — Q04.8 CEREBELLAR TONSILLAR ECTOPIA (CMS/HCC): ICD-10-CM

## 2025-06-12 DIAGNOSIS — S06.0X0D CONCUSSION WITHOUT LOSS OF CONSCIOUSNESS, SUBSEQUENT ENCOUNTER: ICD-10-CM

## 2025-06-12 DIAGNOSIS — E66.9 OBESITY (BMI 35.0-39.9 WITHOUT COMORBIDITY): Primary | ICD-10-CM

## 2025-06-12 PROBLEM — S06.0X0A CONCUSSION WITH NO LOSS OF CONSCIOUSNESS: Status: ACTIVE | Noted: 2025-06-12

## 2025-06-12 PROCEDURE — 3008F BODY MASS INDEX DOCD: CPT | Performed by: FAMILY MEDICINE

## 2025-06-12 PROCEDURE — 99214 OFFICE O/P EST MOD 30 MIN: CPT | Performed by: FAMILY MEDICINE

## 2025-06-12 RX ORDER — LORAZEPAM 1 MG/1
1 TABLET ORAL 2 TIMES DAILY PRN
Qty: 2 TABLET | Refills: 0 | Status: SHIPPED | OUTPATIENT
Start: 2025-06-12

## 2025-06-12 NOTE — PROGRESS NOTES
HPI:  Sonia Bran is an 44 y.o. female presenting for follow-up weight management.  Noting weight loss is slow - down 34 lbs. She is not at [facility] Would like to consider changing to Zepbound.  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 Vision  went black, no LOC.  Sudden onset headache, nausea, confusion.  Seen at [facility] - Dx: closed head injury. Headache improved and nausea disappeared.  5/25 symptoms worsened again with headache, dizziness, diplopia, so went to [facility] ED - CT head normal with the exception of low lying cerebellar tonsils  Via worker's comp going to physical therapy 2 days per week. Symptoms have improved. Went back to work 4 hours per day.  School ends 6/13.         [facility]  Outside Information     CT head without contrast    Anatomical Region Laterality Modality   Head and Neck -- Computed Tomography     Impression      No acute intracranial hemorrhage, midline shift or mass effect.    Low-lying cerebellar tonsils. Semielective MRI of the brain can be obtained to  assess for Chiari malformation.      Exam Complete Date and Time 5/26/2025 11:48 AM , Report Signed Date And Time  5/26/2025 12:58 PM.  The films and dictation have been reviewed with the Resident and I  agree with the findings.  Narrative    EXAMINATION: CT of the head without contrast.    HISTORY: Head trauma    TECHNIQUE: Routine unenhanced axial brain CT was performed per departmental  protocol. Images reviewed in soft tissue and bone algorithm.    Exam DLP (mGy-cm):  599    Comparison: None.    FINDINGS:    There is no acute intracranial hemorrhage, extracerebral fluid  collection,  midline shift, or mass effect. No CT evidence of acute transcortical infarction.  MRI with diffusion weighted imaging has a higher sensitivity for the detection  of acute ischemia. Cerebral volume is age-appropriate.    Low-lying cerebellar tonsils.    Sinuses: Mild mucosal thickening present in the visualized paranasal sinuses.      Calvarium: Calvarium is intact.    Other: None  Procedure Note    Vickey Webster MD - 05/26/2025  Formatting of this note might be different from the original.  EXAMINATION: CT of the head without contrast.    HISTORY: Head trauma    TECHNIQUE: Routine unenhanced axial brain CT was performed per departmental  protocol. Images reviewed in soft tissue and bone algorithm.    Exam DLP (mGy-cm):  599    Comparison: None.    FINDINGS:    There is no acute intracranial hemorrhage, extracerebral fluid collection,  midline shift, or mass effect. No CT evidence of acute transcortical infarction.  MRI with diffusion weighted imaging has a higher sensitivity for the detection  of acute ischemia. Cerebral volume is age-appropriate.    Low-lying cerebellar tonsils.    Sinuses: Mild mucosal thickening present in the visualized paranasal sinuses.      Calvarium: Calvarium is intact.    Other: None        No acute intracranial hemorrhage, midline shift or mass effect.    Low-lying cerebellar tonsils. Semielective MRI of the brain can be obtained to  assess for Chiari malformation.      Exam Complete Date and Time 5/26/2025 11:48 AM , Report Signed Date And Time  5/26/2025 12:58 PM.  The films and dictation have been reviewed with the Resident and I  agree with the findings.  Exam End: 05/26/25 11:48    Specimen Collected: 05/26/25 11:35 Last Resulted: 05/26/25 12:58   Received From: [facility]  Result Received: 06/12/25 16:09    View Encounter        Allergies:  Azithromycin    History reviewed. No pertinent past medical history.    Past Surgical History   Procedure  "Laterality Date     section      x 2    Dilation and evacuation         Social History     Tobacco Use    Smoking status: Never    Smokeless tobacco: Never   Substance Use Topics    Alcohol use: Yes     Comment: once a week       Medications Ordered Prior to Encounter[1]    Family History   Problem Relation Name Age of Onset    No Known Problems Biological Mother      Asthma Biological Father      No Known Problems Biological Brother      No Known Problems Biological Brother      Cancer Maternal Grandmother      Lung cancer Maternal Grandfather      Lung cancer Paternal Grandmother      Thyroid cancer Paternal Grandmother      Heart attack Paternal Grandfather      Skin cancer Paternal Grandfather      Stroke Paternal Grandfather      Breast cancer Neg Hx         Visit Vitals  /70 (BP Location: Left upper arm, Patient Position: Sitting)   Pulse 87   Temp 36.6 °C (97.9 °F) (Temporal)   Ht 1.626 m (5' 4\")   Wt 80.6 kg (177 lb 12.8 oz)   SpO2 98%   BMI 30.52 kg/m²        Physical Exam  Constitutional:       Appearance: Normal appearance.   Neurological:      Mental Status: She is alert.           A/P  1. Obesity (BMI 35.0-39.9 without comorbidity) (Primary)  Has plateaued on max dose of Wegovy  Would like to change to Zepbound  Continue healthy diet and increase exercise  - tirzepatide, weight loss, (ZEPBOUND) 10 mg/0.5 mL subcutaneous PEN injector; Inject 0.5 mL (10 mg total) under the skin every (seven) 7 days.  Dispense: 2 mL; Refill: 0    2. Concussion without loss of consciousness, subsequent encounter  Has improve with physical therapy    3. Cerebellar tonsillar ectopia (CMS/HCC)  Incidental finding  No symptoms prior to concussion to suggest Arnold Chiari  Claustophobia - will Rx Atia  - MRI BRAIN WITH AND WITHOUT CONTRAST; Future       Next appointment recommended:  TBD      The patient (parent) indicates an understanding of the events of today's medical evaluation and agrees to the plan of " care.    I have asked the patient (parent) to be on the alert for new or worsening symptoms and to call the office directly or proceed to the nearest ER if such should occur.    Total time spent on day of encounter 30 minutes.         [1]   Current Outpatient Medications on File Prior to Visit   Medication Sig Dispense Refill    albuterol HFA (PROAIR HFA) 90 mcg/actuation inhaler Inhale 2 puffs every 4 (four) hours as needed for wheezing. 1 each 3    multivitamin/folic acid/dha (MULTIVITAMIN-FA-DHA ORAL) Take 1 tablet by mouth daily.      semaglutide (WEGOVY) 2.4 mg/0.75 mL subcutaneous injection INJECT 2.4 MG UNDER THE SKIN EVERY (SEVEN) 7 DAYS. 3 mL 1     No current facility-administered medications on file prior to visit.

## 2025-07-03 ENCOUNTER — TELEPHONE (OUTPATIENT)
Dept: FAMILY MEDICINE | Facility: CLINIC | Age: 45
End: 2025-07-03
Payer: COMMERCIAL